# Patient Record
Sex: MALE | Race: WHITE | Employment: UNEMPLOYED | URBAN - METROPOLITAN AREA
[De-identification: names, ages, dates, MRNs, and addresses within clinical notes are randomized per-mention and may not be internally consistent; named-entity substitution may affect disease eponyms.]

---

## 2019-09-27 ENCOUNTER — HOSPITAL ENCOUNTER (OUTPATIENT)
Dept: GENERAL RADIOLOGY | Facility: CLINIC | Age: 54
End: 2019-09-27
Attending: FAMILY MEDICINE
Payer: COMMERCIAL

## 2019-09-27 ENCOUNTER — HOSPITAL ENCOUNTER (OUTPATIENT)
Dept: SPEECH THERAPY | Facility: CLINIC | Age: 54
Discharge: HOME | End: 2019-09-27
Attending: FAMILY MEDICINE | Admitting: FAMILY MEDICINE
Payer: COMMERCIAL

## 2019-09-27 DIAGNOSIS — R13.10 DYSPHAGIA: ICD-10-CM

## 2019-09-27 PROCEDURE — 74230 X-RAY XM SWLNG FUNCJ C+: CPT

## 2019-09-27 PROCEDURE — 92611 MOTION FLUOROSCOPY/SWALLOW: CPT | Mod: GN | Performed by: SPEECH-LANGUAGE PATHOLOGIST

## 2019-09-27 NOTE — PROGRESS NOTES
Impression: Oral stage is WNL.  For all consistencies the end of the pharygeal stage is affected by the UES closing prior to all the bolus clearing the pharynx.  Pt did not feel this occuring with thin or pudding consistencies, however with a thicker bolus of cracker had difficulty passing bolus to the esophagus.   The part of the bolus that could not enter the esophagus flowed back up into the pharynx and the pt had a cough/gag before he was able to take and additonal swallow to clear.  Pt may benefit from trial of pharyngeal exercises to promote the strength of the esophageal opening.  Esophagram not ordered but may be beneficial given Esophageal symptoms.     Video Swallow Study  09/27/19    General Information   Type Of Visit Initial   Start Of Care Date 09/27/19   Referring Physician Gracie Navarro MD   Orders Evaluate And Treat   Medical Diagnosis Dysphagia   Onset Of Illness/injury Or Date Of Surgery 07/09/19  (order date)   Precautions/limitations No Known Precautions/limitations   Hearing WFL for exam   Respiratory Status Room air   Prior Level Of Function Comment Eats a regular consistency diet   Patient Role/employment History Other/comments   Living Environment Other, Comments   Patient/family Goals To be able to eat without food getting stuck or couging.   General Information Comments Pt report has intermittent issues with food getting stuck in lower esophagus (based on where he points) that causes pain.  It has worsened to where he also has difficulty with his throat and feels the food witll not pass.  This does not happen every day but more often if eating rice or specific foods.   Pain Assessment   Pain Reported No   Fall Risk Screen   Fall screen comments deferred   Clinical Swallow Evaluation   Oral Musculature generally intact   Structural Abnormalities none present   Dentition present and adequate   Mucosal Quality good   Mandibular Strength and Mobility intact   Oral Labial Strength and  Mobility WFL   Lingual Strength and Mobility WFL   Buccal Strength and Mobility intact   Laryngeal Function Cough;Throat clear;Swallow;Voicing initiated   VFSS Evaluation   VFSS Additional Documentation Yes   VFSS Eval: Radiology   Radiologist Dr. Izaguirre   Views Taken left lateral   Physical Location of Procedure [facility]   VFSS Eval: Thin Liquid Texture Trial   Mode of Presentation, Thin Liquid cup;self-fed   Order of Presentation 1,4   Preparatory Phase WFL   Oral Phase, Thin Liquid WFL   Pharyngeal Phase, Thin Liquid WFL   Rosenbek's Penetration Aspiration Scale: Thin Liquid Trial Results 1 - no aspiration, contrast does not enter airway   Diagnostic Statement Mild post swallow residue above UES.  Cleared with dry swallow.   VFSS Eval: Pudding Thick Liquid Texture Trial   Mode of Presentation, Pudding spoon;fed by clinician   Order of Presentation 2   Preparatory Phase WFL   Oral Phase, Pudding WFL   Pharyngeal Phase, Pudding WFL   Diagnostic Statement Mild post swallow residue above UES.  Cleared with dry swallow.   VFSS Eval: Solid Food Texture Trial   Mode of Presentation, Solid self-fed   Order of Presentation 3,5   Preparatory Phase WFL   Oral Phase, Solid WFL   Pharyngeal Phase, Solid WFL   Rosenbek's Penetration Aspiration Scale: Solid Food Trial Results 1 - no aspiration, contrast does not enter airway   Diagnostic Statement UES opens then closes prior to tail of boluse being cleared from pharynx.  Residue in pharnx flows up to pharynx which pt senses and coughs.  Second swallow to clear.   Swallow Compensations   Swallow Compensations No compensations were used   Educational Assessment   Barriers to Learning No barriers   Esophageal Phase of Swallow   Patient reports or presents with symptoms of esophageal dysphagia Yes   Esophageal comments PMH of GERD- on medication   Swallow Eval: Clinical Impressions   Skilled Criteria for Therapy Intervention No problems identified which require  skilled intervention   Functional Assessment Scale (FAS) 5   Treatment Diagnosis Mild pharyngeal dysphagia, esophageal dysphagia   Diet texture recommendations Regular diet   Recommended Feeding/Eating Techniques maintain upright posture during/after eating for 30 mins   Clinical Impression Comments Oral stage is WNL.  For all consistencies the end of the pharygeal stage is affected by the UES closing prior to all the bolus clearing the pharynx.  Pt did not feel this occuring with thin or pudding consistencies, however with a thicker bolus of cracker had difficulty passing bolus to the esophagus.   The part of the bolus that could not enter the esophagus flowed back up into the pharynx and the pt had a cough/gag before he was able to take and additonal swallow to clear.  Pt may benefit from trial of pharyngeal exercises to promote the strength of the esophageal opening.  Esophagram not ordered but may be beneficial given Esophageal symptoms.   Total Session Time   SLP Eval: VideoFluoroscopic Swallow function Minutes (73211) 25   Total Evaluation Time 25